# Patient Record
Sex: FEMALE | Race: WHITE | NOT HISPANIC OR LATINO | Employment: FULL TIME | URBAN - NONMETROPOLITAN AREA
[De-identification: names, ages, dates, MRNs, and addresses within clinical notes are randomized per-mention and may not be internally consistent; named-entity substitution may affect disease eponyms.]

---

## 2017-04-16 ENCOUNTER — WALK IN (OUTPATIENT)
Dept: URGENT CARE | Age: 37
End: 2017-04-16

## 2017-04-16 VITALS
HEART RATE: 76 BPM | TEMPERATURE: 98.3 F | OXYGEN SATURATION: 100 % | SYSTOLIC BLOOD PRESSURE: 108 MMHG | WEIGHT: 130 LBS | DIASTOLIC BLOOD PRESSURE: 66 MMHG | RESPIRATION RATE: 16 BRPM

## 2017-04-16 DIAGNOSIS — J02.9 SORE THROAT: ICD-10-CM

## 2017-04-16 DIAGNOSIS — K12.2 UVULITIS: Primary | ICD-10-CM

## 2017-04-16 LAB — S PYO AG THROAT QL: NEGATIVE

## 2017-04-16 PROCEDURE — 99213 OFFICE O/P EST LOW 20 MIN: CPT | Performed by: FAMILY MEDICINE

## 2017-04-16 PROCEDURE — 87880 STREP A ASSAY W/OPTIC: CPT | Performed by: FAMILY MEDICINE

## 2017-04-16 RX ORDER — PREDNISONE 20 MG/1
TABLET ORAL
Qty: 10 TABLET | Refills: 0 | Status: SHIPPED | OUTPATIENT
Start: 2017-04-16 | End: 2017-04-21

## 2017-04-16 ASSESSMENT — ENCOUNTER SYMPTOMS
DIARRHEA: 0
VOMITING: 0
SHORTNESS OF BREATH: 0
SORE THROAT: 1
COUGH: 0
ABDOMINAL PAIN: 0
HEADACHES: 0

## 2017-05-12 ENCOUNTER — TRANSFERRED RECORDS (OUTPATIENT)
Dept: HEALTH INFORMATION MANAGEMENT | Facility: CLINIC | Age: 37
End: 2017-05-12

## 2017-05-12 LAB — PHQ9 SCORE: 0

## 2017-05-15 ENCOUNTER — MEDICAL CORRESPONDENCE (OUTPATIENT)
Dept: HEALTH INFORMATION MANAGEMENT | Facility: CLINIC | Age: 37
End: 2017-05-15

## 2017-05-22 ENCOUNTER — TELEPHONE (OUTPATIENT)
Dept: SURGERY | Facility: CLINIC | Age: 37
End: 2017-05-22

## 2017-05-22 NOTE — TELEPHONE ENCOUNTER
PREVISIT INFORMATION                                                    Linda Zuniga is scheduled for future visit with Dr. Clark on 5/25/17 at 10:40am at the [facility].    Reason for visit: Breast pain  Referring provider: Jass Boss MD  Have images been done? No   Location: N/A   Date: N/A  Previous pathology reports? No  Have previous office records been requested? No  Has the patient seen Dr. Clark in the past? (for old records): No    REVIEW                                                      New patient packet mailed to patient: No, will be given at check in.  Medication reconciliation complete: No    PLAN/FOLLOW-UP NEEDED                                                      Previsit review complete.  Patient will see provider at future scheduled appointment.     Patient Reminders Given:    Informed patient to bring an updated list of allergies, medications, pharmacy details and insurance information. Directed patient to come to the 2nd floor, check-in #4 for their appointment. Informed patient to call back if appointment needs to be cancelled or rescheduled at [phone].    Reminded patient to bring any outside records regarding this appointment or have them faxed to clinic at [phone].      Martine Art LPN

## 2017-05-25 ENCOUNTER — OFFICE VISIT (OUTPATIENT)
Dept: SURGERY | Facility: CLINIC | Age: 37
End: 2017-05-25
Payer: COMMERCIAL

## 2017-05-25 VITALS
HEART RATE: 51 BPM | SYSTOLIC BLOOD PRESSURE: 111 MMHG | DIASTOLIC BLOOD PRESSURE: 71 MMHG | BODY MASS INDEX: 22.33 KG/M2 | HEIGHT: 64 IN | WEIGHT: 130.8 LBS

## 2017-05-25 DIAGNOSIS — N64.4 BREAST PAIN, LEFT: Primary | ICD-10-CM

## 2017-05-25 PROCEDURE — 99243 OFF/OP CNSLTJ NEW/EST LOW 30: CPT | Performed by: SURGERY

## 2017-05-25 NOTE — PROGRESS NOTES
CHIEF COMPLAINT:  Chief Complaint   Patient presents with     Consult     left breast sharp pain       HISTORY OF PRESENT ILLNESS:  Linda Zuniga is a 36 year old female who is seen in consultation at the request of  Dr. Skip Boss for evaluation of left breast pain.  Linda reports that breast feeding was initially painful, but then improved.  Recently, she has noticed a burning, sharp pain in the left nipple after nursing, but the pain does not occur after pumping.  The pain is sporadic and seems to happen more when she starts her baby nursing on the left.  The pain can last from 30 minutes to the whole night.  Linda has noticed a white spot on the nipple and has tried using lotion on the nipple to try to get the duct to open up.  She has otherwise noted no breast changes.  There has been no blood in the milk.    Linda reached menarche at age 16.  She is  having had her pregnancy at age 35.  She has  for 7 months so far.  Her periods have not yet resumed.  Linda is not on BCP or other hormonal supplements.  There is FH for breast cancer in her maternal GM who was diagnosed probably in her 50's and her maternal aunt who was diagnosed in her late 40's.  She does not know if her aunt had genetic testing.  There is otherwise no FH for ovarian, colon, uterine, prostate or pancreatic CA.    REVIEW OF SYSTEMS:  Constitutional:  Negative for chills, fatigue, fever and weight change.  Eyes:  Negative for blurred vision, eye pain and photophobia.  ENT:  Negative for hearing problems, ENT pain, congestion, rhinorrhea, epistaxis, hoarseness and dental problems.  Cardiovascular:  Negative for chest pain, palpitations, tachycardia, orthopnea and edema.  Respiratory:  Negative for cough, dyspnea and hemoptysis.  Gastrointestinal:  Negative for abdominal pain, heartburn, constipation, diarrhea and stool changes.  Musculoskeletal:  Negative for arthralgias, back pain and myalgias.  Integumentary/Breast:  See  "HPI.    Past Medical History:   Diagnosis Date     Abnormal vaginal Pap smear      Anal fissure      Chronic kidney disease     hx kidney stone       Past Surgical History:   Procedure Laterality Date     TONSILLECTOMY         No family history on file.    Social History   Substance Use Topics     Smoking status: Never Smoker     Smokeless tobacco: Not on file     Alcohol use No       Patient Active Problem List   Diagnosis     Pregnancy related condition     Indication for care in labor or delivery     Labor abnormality, delivered     No Known Allergies  Current Outpatient Prescriptions   Medication Sig Dispense Refill     acetaminophen (TYLENOL) 325 MG tablet Take 2 tablets (650 mg) by mouth every 4 hours as needed for mild pain or fever (greater than or equal to 38  C /100.4  F (oral) or 38.5  C/ 101.4  F (core).) 100 tablet      ibuprofen (ADVIL,MOTRIN) 400 MG tablet Take 1-2 tablets (400-800 mg) by mouth every 6 hours as needed for other (cramping) 120 tablet      Prenatal Vit-Fe Fumarate-FA (PRENATAL MULTIVITAMIN  PLUS IRON) 27-0.8 MG TABS Take 1 tablet by mouth daily       calcium-vitamin D 500-125 MG-UNIT TABS Take 2 tablets by mouth daily       Cholecalciferol (VITAMIN D3 PO) Take 1,000 Units by mouth daily       Omega-3 Fatty Acids (OMEGA-3 FISH OIL PO) Take 1 g by mouth daily       Vitals: /71  Pulse 51  Ht 1.626 m (5' 4\")  Wt 59.3 kg (130 lb 12.8 oz)  BMI 22.45 kg/m2  BMI= Body mass index is 22.45 kg/(m^2).    EXAM:  GENERAL: healthy, alert and no distress   BREAST:  The breasts appear symmetric with no overlying skin changes.  The nipples are normal bilaterally.  There is no dimpling or thickening of the skin. The breasts appear full.  A small white spot is seen on the tip of the left nipple at 11 o'clock.  With expression, I am able to express milk from multiple ducts, but not from the upper inner part of the left nipple.  No mass is appreciated in either breast.  The breast tissue is " generally full and benign feeling.  Along the 11 o'clock radian of the left breast, the tissue feels thicker and lumpier.  With palpation of the nipple, there is some pain elicited.  There is no axillary or supraclavicular lymphadenopathy.  CARDIOVASCULAR:  No edema or JVD.  RESPIRATORY: negative findings: no chest deformities noted, no chest wall tenderness, breathing is unlabored.  NECK: Neck supple. No adenopathy.   SKIN: No suspicious lesions or rashes  LYMPH: Normal cervical lymph nodes    ASSESSMENT:  Linda Zuniga has no findings of concern for malignancy on exam.  I see no indication for diagnostic imaging at this time.  I explained to Linda that it appears that the opening of a duct or ducts has closed over on the left nipple for some reason.  I am unable to feel an obstructing mass or other cause.  I recommended that she try applying hot moist compresses and possibly cabbage leaves.  I also suggested that since pumping doesn't cause the pain that she pump only for a few days prior to trying nursing again.  When she does try nursing again, I recommended that she start with the right breast since she didn't have pain in the left when the baby nursed on that side second.  Lastly, if she again experiences the pain after trying these measures, I suggested that she contact a lactation consultant.      PLAN:  Linda will follow up with me as needed.  Certainly if she notices blood in the milk or any concerning breast changes I would want to see her.  She was invited to call me with further questions.    Total time with patient visit: 45 minutes including discussions about the plan of care and care coordination with the patient.    Germania Clark MD    Please route or send letter to:  Primary Care Provider (PCP)

## 2017-05-25 NOTE — LETTER
May 25, 2017      RE:  Linda Zuniga-:  [birth year]    HISTORY OF PRESENT ILLNESS:  Linda Zuniga is a 36 year old female who is seen in consultation at the request of  Dr. Skip Boss for evaluation of left breast pain.  Linda reports that breast feeding was initially painful, but then improved.  Recently, she has noticed a burning, sharp pain in the left nipple after nursing, but the pain does not occur after pumping.  The pain is sporadic and seems to happen more when she starts her baby nursing on the left.  The pain can last from 30 minutes to the whole night.  Linda has noticed a white spot on the nipple and has tried using lotion on the nipple to try to get the duct to open up.  She has otherwise noted no breast changes.  There has been no blood in the milk.     Linda reached menarche at age 16.  She is  having had her pregnancy at age 35.  She has  for 7 months so far.  Her periods have not yet resumed.  Linda is not on BCP or other hormonal supplements.  There is FH for breast cancer in her maternal GM who was diagnosed probably in her 50's and her maternal aunt who was diagnosed in her late 40's.  She does not know if her aunt had genetic testing.  There is otherwise no FH for ovarian, colon, uterine, prostate or pancreatic CA.     REVIEW OF SYSTEMS:  Constitutional:  Negative for chills, fatigue, fever and weight change.  Eyes:  Negative for blurred vision, eye pain and photophobia.  ENT:  Negative for hearing problems, ENT pain, congestion, rhinorrhea, epistaxis, hoarseness and dental problems.  Cardiovascular:  Negative for chest pain, palpitations, tachycardia, orthopnea and edema.  Respiratory:  Negative for cough, dyspnea and hemoptysis.  Gastrointestinal:  Negative for abdominal pain, heartburn, constipation, diarrhea and stool changes.  Musculoskeletal:  Negative for arthralgias, back pain and myalgias.  Integumentary/Breast:  See HPI.     Vitals: /71  Pulse 51  Ht  "1.626 m (5' 4\")  Wt 59.3 kg (130 lb 12.8 oz)  BMI 22.45 kg/m2  BMI= Body mass index is 22.45 kg/(m^2).     EXAM:  GENERAL: healthy, alert and no distress   BREAST:  The breasts appear symmetric with no overlying skin changes.  The nipples are normal bilaterally.  There is no dimpling or thickening of the skin. The breasts appear full.  A small white spot is seen on the tip of the left nipple at 11 o'clock.  With expression, I am able to express milk from multiple ducts, but not from the upper inner part of the left nipple.  No mass is appreciated in either breast.  The breast tissue is generally full and benign feeling.  Along the 11 o'clock radian of the left breast, the tissue feels thicker and lumpier.  With palpation of the nipple, there is some pain elicited.  There is no axillary or supraclavicular lymphadenopathy.  CARDIOVASCULAR:  No edema or JVD.  RESPIRATORY: negative findings: no chest deformities noted, no chest wall tenderness, breathing is unlabored.  NECK: Neck supple. No adenopathy.   SKIN: No suspicious lesions or rashes  LYMPH: Normal cervical lymph nodes     ASSESSMENT:  Linda Zuniga has no findings of concern for malignancy on exam.  I see no indication for diagnostic imaging at this time.  I explained to Linda that it appears that the opening of a duct or ducts has closed over on the left nipple for some reason.  I am unable to feel an obstructing mass or other cause.  I recommended that she try applying hot moist compresses and possibly cabbage leaves.  I also suggested that since pumping doesn't cause the pain that she pump only for a few days prior to trying nursing again.  When she does try nursing again, I recommended that she start with the right breast since she didn't have pain in the left when the baby nursed on that side second.  Lastly, if she again experiences the pain after trying these measures, I suggested that she contact a lactation consultant.      PLAN:  Linda will " follow up with me as needed.  Certainly if she notices blood in the milk or any concerning breast changes I would want to see her.  She was invited to call me with further questions.        Germania Clark MD

## 2017-05-25 NOTE — NURSING NOTE
"Linda Zuniga's goals for this visit include: left breast sharp pain  She requests these members of her care team be copied on today's visit information: no    PCP: Skip Boss    Referring Provider:  Skip Boss MD  [facility]  [id number] [place] [address]    Chief Complaint   Patient presents with     Consult     left breast sharp pain       Initial There were no vitals taken for this visit. Estimated body mass index is 26.26 kg/(m^2) as calculated from the following:    Height as of 10/16/16: 1.626 m (5' 4\").    Weight as of 10/16/16: 69.4 kg (153 lb).  Medication Reconciliation: complete    Do you need any medication refills at today's visit? No    Martine Art LPN      "

## 2023-12-08 ENCOUNTER — LAB REQUISITION (OUTPATIENT)
Dept: LAB | Facility: CLINIC | Age: 43
End: 2023-12-08

## 2023-12-08 DIAGNOSIS — R53.83 OTHER FATIGUE: ICD-10-CM

## 2023-12-08 LAB
ERYTHROCYTE [DISTWIDTH] IN BLOOD BY AUTOMATED COUNT: 13.4 % (ref 10–15)
HCT VFR BLD AUTO: 40.8 % (ref 35–47)
HGB BLD-MCNC: 13.5 G/DL (ref 11.7–15.7)
MCH RBC QN AUTO: 29.3 PG (ref 26.5–33)
MCHC RBC AUTO-ENTMCNC: 33.1 G/DL (ref 31.5–36.5)
MCV RBC AUTO: 89 FL (ref 78–100)
PLATELET # BLD AUTO: 329 10E3/UL (ref 150–450)
RBC # BLD AUTO: 4.61 10E6/UL (ref 3.8–5.2)
TSH SERPL DL<=0.005 MIU/L-ACNC: 1.69 UIU/ML (ref 0.3–4.2)
WBC # BLD AUTO: 10.7 10E3/UL (ref 4–11)

## 2023-12-08 PROCEDURE — 84443 ASSAY THYROID STIM HORMONE: CPT | Performed by: OBSTETRICS & GYNECOLOGY

## 2023-12-08 PROCEDURE — 85027 COMPLETE CBC AUTOMATED: CPT | Performed by: OBSTETRICS & GYNECOLOGY

## 2024-04-30 ENCOUNTER — LAB REQUISITION (OUTPATIENT)
Dept: LAB | Facility: CLINIC | Age: 44
End: 2024-04-30

## 2024-04-30 DIAGNOSIS — L70.9 ACNE, UNSPECIFIED: ICD-10-CM

## 2024-04-30 PROCEDURE — 82306 VITAMIN D 25 HYDROXY: CPT | Performed by: OBSTETRICS & GYNECOLOGY

## 2024-04-30 PROCEDURE — 84403 ASSAY OF TOTAL TESTOSTERONE: CPT | Performed by: OBSTETRICS & GYNECOLOGY

## 2024-04-30 PROCEDURE — 84270 ASSAY OF SEX HORMONE GLOBUL: CPT | Performed by: OBSTETRICS & GYNECOLOGY

## 2024-05-01 LAB
SHBG SERPL-SCNC: 106 NMOL/L (ref 30–135)
VIT D+METAB SERPL-MCNC: 63 NG/ML (ref 20–50)

## 2024-05-03 LAB
TESTOST FREE SERPL-MCNC: 0.12 NG/DL
TESTOST SERPL-MCNC: 15 NG/DL (ref 8–60)

## 2024-05-24 ENCOUNTER — LAB REQUISITION (OUTPATIENT)
Dept: LAB | Facility: CLINIC | Age: 44
End: 2024-05-24

## 2024-05-24 DIAGNOSIS — N95.9 UNSPECIFIED MENOPAUSAL AND PERIMENOPAUSAL DISORDER: ICD-10-CM

## 2024-05-24 PROCEDURE — 84144 ASSAY OF PROGESTERONE: CPT | Performed by: OBSTETRICS & GYNECOLOGY

## 2024-05-25 LAB — PROGEST SERPL-MCNC: 3.5 NG/ML
